# Patient Record
(demographics unavailable — no encounter records)

---

## 2025-07-23 NOTE — HISTORY OF PRESENT ILLNESS
[Born at ___ Wks Gestation] : The patient was born at [unfilled] weeks gestation [] : via normal spontaneous vaginal delivery [[facility]] : at [facility] [BW: _____] : weight of [unfilled] [DW: _____] : Discharge weight was [unfilled] [MBT: ____] : MBT - [unfilled] [TsB: _____] : Total Serum Bilirubin [unfilled] mg/dL [] : negative [FreeTextEntry9] : O+ [de-identified] : 24 [FreeTextEntry1] : pregnancy uncomplicated , has planned  for following day all BF yesterday with 3 urine diapers, today with 3 BMs, unsure if urine mixed in  mom NP at [facility]

## 2025-07-23 NOTE — PHYSICAL EXAM
[Alert] : alert [Normocephalic] : normocephalic [Flat Open Anterior [place]] : flat open anterior fontanelle [PERRL] : PERRL [Red Reflex Bilateral] : red reflex bilateral [Normally Placed Ears] : normally placed ears [Auricles Well Formed] : auricles well formed [Clear Tympanic membranes] : clear tympanic membranes [Light reflex present] : light reflex present [Bony structures visible] : bony structures visible [Patent Auditory Canal] : patent auditory canal [Nares Patent] : nares patent [Palate Intact] : palate intact [Uvula Midline] : uvula midline [Supple, full passive range of motion] : supple, full passive range of motion [Symmetric Chest Rise] : symmetric chest rise [Clear to Auscultation Bilaterally] : clear to auscultation bilaterally [Regular Rate and Rhythm] : regular rate and rhythm [S1, S2 present] : S1, S2 present [+2 Femoral Pulses] : +2 femoral pulses [Soft] : soft [Bowel Sounds] : bowel sounds present [Umbilical Stump Dry, Clean, Intact] : umbilical stump dry, clean, intact [Normal external genitalia] : normal external genitalia [Patent Vagina] : patent vagina [Patent] : patent [Normally Placed] : normally placed [No Abnormal Lymph Nodes Palpated] : no abnormal lymph nodes palpated [Symmetric Flexed Extremities] : symmetric flexed extremities [Startle Reflex] : startle reflex present [Suck Reflex] : suck reflex present [Rooting] : rooting reflex present [Palmar Grasp] : palmar grasp present [Plantar Grasp] : plantar reflex present [Symmetric [place]] : symmetric [place] [Acute Distress] : no acute distress [Icteric sclera] : nonicteric sclera [Discharge] : no discharge [Palpable Masses] : no palpable masses [Murmurs] : no murmurs [Tender] : nontender [Distended] : not distended [Hepatomegaly] : no hepatomegaly [Splenomegaly] : no splenomegaly [Clitoromegaly] : no clitoromegaly [Link-Ortolani] : negative Link-Ortolani [Spinal Dimple] : no spinal dimple [Tuft of Hair] : no tuft of hair [Jaundice] : not jaundice

## 2025-07-23 NOTE — DISCUSSION/SUMMARY
[FreeTextEntry1] :  discussed not going out in public/limiting visitors sleep on back carseat safety rectal thermometer - any fever 100.4 or greater, to ER umbilical cord care  tc bili = 14.5 all BF f/u in 2 days for weight/bili check mom feels her milk is coming in now. but open to starting formula if necessary start Vit D

## 2025-07-25 NOTE — HISTORY OF PRESENT ILLNESS
[FreeTextEntry6] : Baby with jaundice, here for follow up Mom is breast feeding exclusively. baby is feeding well. Adequate urine output  and  BM's Cord fell off yesterday  and is oozing

## 2025-07-25 NOTE — DISCUSSION/SUMMARY
[FreeTextEntry1] : Chemical cauterization of granuloma with Silver nitrate Advised to keep area dry for 2 days TCB S 16.9 baby has gained adequate weight Will recheck bili tomorrow

## 2025-07-29 NOTE — HISTORY OF PRESENT ILLNESS
[FreeTextEntry6] : baby was seen here yesterday and Bilirubin was 16.9 Here for f/u Is exclusively breast fed Granuloma was cauterized, still oozing